# Patient Record
Sex: MALE | Race: WHITE | NOT HISPANIC OR LATINO | Employment: FULL TIME | URBAN - METROPOLITAN AREA
[De-identification: names, ages, dates, MRNs, and addresses within clinical notes are randomized per-mention and may not be internally consistent; named-entity substitution may affect disease eponyms.]

---

## 2022-05-22 ENCOUNTER — OFFICE VISIT (OUTPATIENT)
Dept: URGENT CARE | Facility: CLINIC | Age: 37
End: 2022-05-22
Payer: COMMERCIAL

## 2022-05-22 ENCOUNTER — HOSPITAL ENCOUNTER (OUTPATIENT)
Dept: RADIOLOGY | Facility: MEDICAL CENTER | Age: 37
End: 2022-05-22
Attending: NURSE PRACTITIONER
Payer: COMMERCIAL

## 2022-05-22 VITALS
OXYGEN SATURATION: 96 % | DIASTOLIC BLOOD PRESSURE: 78 MMHG | TEMPERATURE: 97.8 F | RESPIRATION RATE: 17 BRPM | BODY MASS INDEX: 27.92 KG/M2 | SYSTOLIC BLOOD PRESSURE: 124 MMHG | HEART RATE: 60 BPM | HEIGHT: 70 IN | WEIGHT: 195 LBS

## 2022-05-22 DIAGNOSIS — R10.31 COLICKY RLQ ABDOMINAL PAIN: ICD-10-CM

## 2022-05-22 DIAGNOSIS — A09 TRAVELER'S DIARRHEA: ICD-10-CM

## 2022-05-22 DIAGNOSIS — R10.13 EPIGASTRIC PAIN: ICD-10-CM

## 2022-05-22 LAB
APPEARANCE UR: CLEAR
BILIRUB UR STRIP-MCNC: NEGATIVE MG/DL
COLOR UR AUTO: YELLOW
GLUCOSE UR STRIP.AUTO-MCNC: NEGATIVE MG/DL
KETONES UR STRIP.AUTO-MCNC: NORMAL MG/DL
LEUKOCYTE ESTERASE UR QL STRIP.AUTO: NEGATIVE
NITRITE UR QL STRIP.AUTO: NEGATIVE
PH UR STRIP.AUTO: 7 [PH] (ref 5–8)
PROT UR QL STRIP: NEGATIVE MG/DL
RBC UR QL AUTO: NEGATIVE
SP GR UR STRIP.AUTO: 1.01
UROBILINOGEN UR STRIP-MCNC: 0.2 MG/DL

## 2022-05-22 PROCEDURE — 81002 URINALYSIS NONAUTO W/O SCOPE: CPT | Performed by: NURSE PRACTITIONER

## 2022-05-22 PROCEDURE — 99203 OFFICE O/P NEW LOW 30 MIN: CPT | Performed by: NURSE PRACTITIONER

## 2022-05-22 PROCEDURE — 74176 CT ABD & PELVIS W/O CONTRAST: CPT

## 2022-05-22 RX ORDER — AZITHROMYCIN 500 MG/1
1000 TABLET, FILM COATED ORAL DAILY
Qty: 2 TABLET | Refills: 0 | Status: SHIPPED | OUTPATIENT
Start: 2022-05-22 | End: 2022-05-23

## 2022-05-22 RX ORDER — GABAPENTIN 300 MG/1
300 CAPSULE ORAL 3 TIMES DAILY
COMMUNITY
End: 2022-05-23

## 2022-05-22 NOTE — PROGRESS NOTES
"Subjective:   Jason Barboza is a 37 y.o. male who presents for Abdominal Burn and Abdominal Pain (Stomache pain x 7 days , diarrhea and sharp pain within right mid abdomen )       HPI  Patient presents for evaluation of 7-day history of intermittent abdominal pain, diarrhea, and feeling uncomfortable.  Patient has not found any aggravating or alleviating factors.  Recently traveled to [place], and denies any known exposures.  Patient's wife is healthy and well.  No new dietary changes, medications, or supplements.  Denies fever or chills.    ROS  All other systems are negative except as documented above within HPI.    MEDS:   Current Outpatient Medications:   •  gabapentin (NEURONTIN) 300 MG Cap, Take 300 mg by mouth 3 times a day. (Patient not taking: Reported on 5/22/2022), Disp: , Rfl:   ALLERGIES:   Allergies   Allergen Reactions   • Bactrim Ds    • Chlorhexidine    • Sulfa Drugs Vomiting     Hives swelling       Patient's PMH, SocHx, SurgHx, FamHx, Drug allergies and medications were reviewed.     Objective:   /78 (BP Location: Left arm, Patient Position: Sitting, BP Cuff Size: Adult)   Pulse 60   Temp 36.6 °C (97.8 °F) (Temporal)   Resp 17   Ht 1.778 m (5' 10\")   Wt 88.5 kg (195 lb)   SpO2 96%   BMI 27.98 kg/m²     Physical Exam  Vitals and nursing note reviewed.   Constitutional:       General: He is awake.      Appearance: Normal appearance. He is well-developed.   HENT:      Head: Normocephalic and atraumatic.      Right Ear: External ear normal.      Left Ear: External ear normal.      Nose: Nose normal.      Mouth/Throat:      Lips: Pink.      Mouth: Mucous membranes are moist.      Pharynx: Oropharynx is clear.   Eyes:      General: Lids are normal.      Extraocular Movements: Extraocular movements intact.      Conjunctiva/sclera: Conjunctivae normal.   Cardiovascular:      Rate and Rhythm: Normal rate and regular rhythm.   Pulmonary:      Effort: Pulmonary effort is normal. "   Abdominal:      Tenderness: There is generalized abdominal tenderness and tenderness in the right lower quadrant.   Musculoskeletal:         General: Normal range of motion.      Cervical back: Normal range of motion.   Skin:     General: Skin is warm and dry.   Neurological:      Mental Status: He is alert and oriented to person, place, and time.   Psychiatric:         Mood and Affect: Mood normal.         Behavior: Behavior normal. Behavior is cooperative.         Thought Content: Thought content normal.         Judgment: Judgment normal.       Narrative & Impression     5/22/2022 1:06 PM     HISTORY/REASON FOR EXAM:  RLQ abdominal pain (Age >= 14y).     TECHNIQUE/EXAM DESCRIPTION:  CT scan of the abdomen and pelvis without contrast.     Noncontrast helical scanning was obtained from the diaphragmatic domes through the pubic symphysis.     Low dose optimization technique was utilized for this CT exam including automated exposure control and adjustment of the mA and/or kV according to patient size.     COMPARISON: None.     FINDINGS:  Lower Chest: Unremarkable.     Liver: Unremarkable.     Spleen: Unremarkable.     Pancreas: Unremarkable.     Gallbladder: No calcified stones.     Biliary: Nondilated.     Adrenal glands: Normal.     Kidneys: Unremarkable without hydronephrosis.     Bowel: No obstruction or acute inflammation.  Appendix is partially visualized.  No pericolonic inflammatory changes.     Lymph nodes: No adenopathy.     Vasculature: Unremarkable.     Peritoneum: Unremarkable without ascites.     Musculoskeletal: No acute or destructive process.     Pelvis: Bladder is decompressed.  No dependent fluid.     IMPRESSION:     1.  No secondary signs of acute appendicitis, however the appendix is partially obscured.  2.  No bowel obstruction or focal mesenteric inflammatory process.  3.  No renal stone or hydronephrosis.                      Exam Ended: 05/22/22  1:17 PM Last Resulted: 05/22/22  1:26 PM                Assessment/Plan:   Assessment    1. Colicky RLQ abdominal pain  - CT-ABDOMEN-PELVIS W/O; Future  - POCT Urinalysis    2. Epigastric pain  - CT-ABDOMEN-PELVIS W/O; Future  - POCT Urinalysis    3. Traveler's diarrhea  - azithromycin (ZITHROMAX) 500 MG tablet; Take 2 Tablets by mouth every day for 1 dose.  Dispense: 2 Tablet; Refill: 0      Vital signs stable at today's acute urgent care visit. Reviewed test results that were completed in the clinic.  Ordered CAT scan abdomen and pelvis, call patient with results post visit.  Discussed management options as indicated.  Discussed that appendicitis is unlikely, however to monitor due to partially obscured appendix.  Recommend trial of Pepto-Bismol and Imodium AD.  Symptoms not resolve, then he may trial Zithromax as listed.    Advised the patient to follow-up with the primary care provider for recheck, reevaluation, and/or consideration of further management. Return to urgent care with any worsening/continued symptoms.  Red flags discussed and indications to immediately call 911 or present to the ED.  All questions were encouraged and answered to the patient's satisfaction and understanding, and they agree to the plan of care.     I personally reviewed prior external notes and test results pertinent to today's visit.  I have independently reviewed and interpreted all diagnostics ordered during this urgent care acute visit. Time spent evaluating this patient was a minimum of 30 minutes and includes preparing for visit, counseling/education, exam, evaluation, obtaining history, and ordering lab/test/procedures.      Please note that this dictation was created using voice recognition software. I have made a reasonable attempt to correct obvious errors, but I expect that there are errors of grammar and possibly content that I did not discover before finalizing the note.

## 2023-12-22 ENCOUNTER — OFFICE VISIT (OUTPATIENT)
Dept: DERMATOLOGY | Facility: IMAGING CENTER | Age: 38
End: 2023-12-22
Payer: COMMERCIAL

## 2023-12-22 DIAGNOSIS — L72.11 PILAR CYST: ICD-10-CM

## 2023-12-22 DIAGNOSIS — L98.9 ENLARGING SKIN LESION: ICD-10-CM

## 2023-12-22 PROCEDURE — 99203 OFFICE O/P NEW LOW 30 MIN: CPT | Performed by: DERMATOLOGY

## 2025-01-30 ENCOUNTER — OFFICE VISIT (OUTPATIENT)
Dept: DERMATOLOGY | Facility: IMAGING CENTER | Age: 40
End: 2025-01-30
Payer: COMMERCIAL

## 2025-01-30 VITALS — SYSTOLIC BLOOD PRESSURE: 118 MMHG | DIASTOLIC BLOOD PRESSURE: 78 MMHG | TEMPERATURE: 97.1 F

## 2025-01-30 DIAGNOSIS — L72.11 PILAR CYST: ICD-10-CM

## 2025-01-30 DIAGNOSIS — L98.9 ENLARGING SKIN LESION: ICD-10-CM

## 2025-01-30 PROBLEM — E78.00 ELEVATED LDL CHOLESTEROL LEVEL: Status: ACTIVE | Noted: 2022-09-21

## 2025-01-30 RX ORDER — TRAZODONE HYDROCHLORIDE 50 MG/1
TABLET, FILM COATED ORAL
COMMUNITY
Start: 2024-12-30

## 2025-01-30 RX ORDER — TRIAMCINOLONE ACETONIDE 1 MG/ML
LOTION TOPICAL
COMMUNITY

## 2025-01-31 NOTE — PROGRESS NOTES
BENIGN EXCISION PROCEDURE NOTE:    Risks, benefits and alternatives of procedure, including, but not limited to scar/poor cosmetic outcome, bleeding, pain, infection, nerve damage, recurrence of lesion, failed surgery, and need for further surgery, were discussed and written informed consent obtained. Verbal time out completed.     Allergies reviewed: Yes  Pacemaker/defibrillator: No  Artificial joints: No  Antibiotics given: No  Blood thinners/anticoagulants: No    Pre-op diagnosis: pilar cysts  Post-op diagnosis: Same  Site:3 on scalp - left temple, right lateral and superior  Pre-op size: 0.6cm, 1.2 cm and 1.1cm    There were no vitals taken for this visit.    Procedure: Area of surgery was prepped with alcohol, marked with a sterile marking pen. Anesthesia with 1% lidocaine with epinephrine administered with 30 gauge needle. The area was again cleaned with alcohol swabs (allergy to chlorhexidine).     For each site, With sterile technique, a 15 blade scalpel was used to make a linear incision over the lesion to the level of the subcutaneous fat. Dissection was completed with iris/tissue scissors, and the mass was removed. Hemostasis was achieved with pressure and light hyfrecation.  Specimen was placed into biopsy container and sent to pathology by staff.    Intermediate closure note:  4.0 monocryl buried vertical mattress sutures were placed to close dead space. Surgical skin staples were placed to approximate wound edges.  Vaseline applied to wounds. Patient tolerated procedure well and there were no complications, blood loss was minimal.     Final wound size: 3.3 cm    Bandage was placed with vaseline, telfa, gauze and tape. Wound care was discussed with the patient, and written instructions were provided. Patient to return to clinic in 10-14 days for suture removal. Patient to call us if any problems or concerns with the procedure site arise prior to scheduled appointment.    Kimberli Anton MD

## 2025-02-10 ENCOUNTER — NON-PROVIDER VISIT (OUTPATIENT)
Dept: DERMATOLOGY | Facility: IMAGING CENTER | Age: 40
End: 2025-02-10
Payer: COMMERCIAL

## 2025-02-11 NOTE — PROGRESS NOTES
Jason Barboza is a 39 y.o. male here for a Non-Provider Visit for Suture Removal.    Sutures were placed by Kimberli Anton on date: 01/30/25  Skin is healed: Yes  Provider notified if skin is not healed, or if there is redness, heat, pain, or drainage from incision: no  Sutures removed.   Mastisol and steristips are placed: No    Advised to use emollient (vaseline, aquaphor, etc.) as needed, avoid peroxide and antibiotic ointment to reduce irritation.     Path report has not been reviewed by provider.  Path report has not reviewed with patient.

## 2025-08-12 ENCOUNTER — APPOINTMENT (OUTPATIENT)
Dept: MEDICAL GROUP | Facility: PHYSICIAN GROUP | Age: 40
End: 2025-08-12
Payer: COMMERCIAL